# Patient Record
Sex: FEMALE | Race: WHITE | Employment: OTHER | URBAN - METROPOLITAN AREA
[De-identification: names, ages, dates, MRNs, and addresses within clinical notes are randomized per-mention and may not be internally consistent; named-entity substitution may affect disease eponyms.]

---

## 2017-01-24 ENCOUNTER — HOSPITAL ENCOUNTER (OUTPATIENT)
Dept: CT IMAGING | Age: 82
Discharge: HOME | End: 2017-01-24
Attending: INTERNAL MEDICINE
Payer: SELF-PAY

## 2017-01-24 DIAGNOSIS — E78.5 HYPERLIPIDEMIA: ICD-10-CM

## 2017-01-24 PROCEDURE — 75571 CT HRT W/O DYE W/CA TEST: CPT

## 2017-01-26 ENCOUNTER — TELEPHONE (OUTPATIENT)
Dept: CARDIAC REHAB | Age: 82
End: 2017-01-26

## 2017-01-26 NOTE — TELEPHONE ENCOUNTER
Called patient to discuss her CT scan results. Verified herdate of birth and then reviewed the patient results. Hercalcium score is 425. This corresponds to a significant amount of plaque noted in the coronary arteries. Kevin Hover a significantly greater risk of having a heart attack. It is reccommended that shefollow up with their PCP to make sure that any and all risk factors are being optimally managed and then the patient will most likely be referred to a cardiologist for further testing and treatments. Will fax copy of the report to herPCP, Malena Pruett. Patient verbalized understanding of the results.

## 2019-02-19 ENCOUNTER — OFFICE VISIT (OUTPATIENT)
Dept: INTERNAL MEDICINE CLINIC | Age: 84
End: 2019-02-19

## 2019-02-19 VITALS
HEIGHT: 61 IN | OXYGEN SATURATION: 97 % | SYSTOLIC BLOOD PRESSURE: 131 MMHG | HEART RATE: 67 BPM | BODY MASS INDEX: 27.19 KG/M2 | WEIGHT: 144 LBS | RESPIRATION RATE: 18 BRPM | TEMPERATURE: 97.9 F | DIASTOLIC BLOOD PRESSURE: 59 MMHG

## 2019-02-19 DIAGNOSIS — I48.20 CHRONIC ATRIAL FIBRILLATION (HCC): Primary | ICD-10-CM

## 2019-02-19 DIAGNOSIS — E78.2 MIXED HYPERLIPIDEMIA: ICD-10-CM

## 2019-02-19 DIAGNOSIS — B96.81 GASTRITIS DUE TO HELICOBACTER SPECIES: ICD-10-CM

## 2019-02-19 DIAGNOSIS — K21.9 GASTROESOPHAGEAL REFLUX DISEASE WITHOUT ESOPHAGITIS: ICD-10-CM

## 2019-02-19 DIAGNOSIS — J84.10 POSTINFLAMMATORY PULMONARY FIBROSIS (HCC): ICD-10-CM

## 2019-02-19 DIAGNOSIS — I10 ESSENTIAL HYPERTENSION: ICD-10-CM

## 2019-02-19 DIAGNOSIS — K57.30 DIVERTICULOSIS OF LARGE INTESTINE WITHOUT HEMORRHAGE: ICD-10-CM

## 2019-02-19 DIAGNOSIS — K29.70 GASTRITIS DUE TO HELICOBACTER SPECIES: ICD-10-CM

## 2019-02-19 DIAGNOSIS — M48.062 LUMBAR STENOSIS WITH NEUROGENIC CLAUDICATION: ICD-10-CM

## 2019-02-19 RX ORDER — TEMAZEPAM 15 MG/1
1 CAPSULE ORAL
Status: ON HOLD | COMMUNITY
End: 2022-06-09

## 2019-02-19 RX ORDER — VALACYCLOVIR HYDROCHLORIDE 500 MG/1
1 TABLET, FILM COATED ORAL 2 TIMES DAILY
COMMUNITY

## 2019-02-19 RX ORDER — FEXOFENADINE HCL AND PSEUDOEPHEDRINE HCI 180; 240 MG/1; MG/1
1 TABLET, EXTENDED RELEASE ORAL DAILY
COMMUNITY
End: 2019-04-02

## 2019-02-19 RX ORDER — MONTELUKAST SODIUM 10 MG/1
10 TABLET ORAL DAILY
COMMUNITY
End: 2019-07-03 | Stop reason: SDUPTHER

## 2019-02-19 RX ORDER — RANITIDINE 300 MG/1
1 TABLET ORAL
COMMUNITY
End: 2019-02-28 | Stop reason: SDUPTHER

## 2019-02-19 RX ORDER — DILTIAZEM HYDROCHLORIDE 120 MG/1
1 CAPSULE, EXTENDED RELEASE ORAL DAILY
COMMUNITY
End: 2019-05-05 | Stop reason: SINTOL

## 2019-02-19 RX ORDER — HYDROCHLOROTHIAZIDE 12.5 MG/1
12.5 TABLET ORAL DAILY
COMMUNITY
End: 2019-04-02

## 2019-02-19 RX ORDER — FLECAINIDE ACETATE 50 MG/1
1 TABLET ORAL 2 TIMES DAILY
COMMUNITY
End: 2019-04-02

## 2019-02-19 RX ORDER — SIMVASTATIN 10 MG/1
1 TABLET, FILM COATED ORAL
COMMUNITY

## 2019-02-19 RX ORDER — CANDESARTAN 8 MG/1
TABLET ORAL DAILY
COMMUNITY

## 2019-02-19 RX ORDER — OMEPRAZOLE 20 MG/1
20 CAPSULE, DELAYED RELEASE ORAL
COMMUNITY
End: 2019-04-02

## 2019-02-19 NOTE — PROGRESS NOTES
Chief Complaint   Patient presents with    Hypertension    Cholesterol Problem    Irregular Heart Beat     1. Have you been to the ER, urgent care clinic since your last visit? Hospitalized since your last visit? No    2. Have you seen or consulted any other health care providers outside of the [address] [place] [address] since your last visit? Include any pap smears or colon screening.  No

## 2019-02-19 NOTE — PROGRESS NOTES
HPI:     This christiano lady presents to the office for transfer of medical care. None of her [facility] records have been forwarded for review. [address] [facility] was queried and data populated into EMR with reconciliation of medications performed. She was diagnosed with H pylori + gastritis and prescribed triple therapy which she has placed on hold because of upcoming move. She is in the process of selling her home and moving to a smaller residence. She has severe GERD complicated by aspiration with findings of RLL fibrosis. Her symptoms have been well controlled on combination of Omeprazole and Ranitidine and has been advised to remain on this indefinitely. She recently completed Ceftin for sinusitis with improvement. She denied any accompanying fever, chills, sore throat or productive cough. She remains on NOAC for PAF and denies any bleeding complications on this. She has not had any problems with palpitations since last visit. She reports upcoming appointmentwith Dr. Rita Houston for radicular back pain ascribed to lumbar stenosis. The MRI report is not on file. She is aware that she will need to stop NOAC at least 48 hours prior to any intervention. Past Medical History:   Diagnosis Date    Atrial fibrillation (HCC)     GERD (gastroesophageal reflux disease)     Hypercholesterolemia     Hypertension     Pulmonary fibrosis (HCC)      Past Surgical History:   Procedure Laterality Date    HX GI      HX GYN      HX HEENT      HX HYSTERECTOMY      HX ORTHOPAEDIC       Current Outpatient Medications   Medication Sig    candesartan (ATACAND) 8 mg tablet Take  by mouth daily.  dilTIAZem XR (DILACOR XR) 120 mg XR capsule Take 1 Cap by mouth daily.  fexofenadine-pseudoephedrine (ALLEGRA-D 24) 180-240 mg per tablet Take 1 Tab by mouth daily.  flecainide (TAMBOCOR) 50 mg tablet Take 1 Tab by mouth two (2) times a day.     hydroCHLOROthiazide (HYDRODIURIL) 12.5 mg tablet Take 12.5 mg by mouth daily.    montelukast (SINGULAIR) 10 mg tablet Take 10 mg by mouth daily.  omeprazole (PRILOSEC) 20 mg capsule Take 20 mg by mouth ACB/HS.  raNITIdine (ZANTAC) 300 mg tab Take 1 Tab by mouth nightly.  simvastatin (ZOCOR) 10 mg tablet Take 1 Tab by mouth nightly.  temazepam (RESTORIL) 15 mg capsule Take 1 Cap by mouth nightly as needed.  valACYclovir (VALTREX) 500 mg tablet Take 1 Tab by mouth two (2) times a day.  apixaban (ELIQUIS) 5 mg tablet Take 1 Tab by mouth two (2) times a day.  vit a,c & e-lutein-minerals (OCUVITE) tablet daily. No current facility-administered medications for this visit. Allergies and Intolerances:   No Known Allergies  Family History:   No family history on file. Social History:   She  reports that she has quit smoking. she has never used smokeless tobacco.   Social History     Substance and Sexual Activity   Alcohol Use Yes    Comment: q hs     Immunization History:  Outside records reviewed      Review of Systems   Constitutional: Negative for chills, fever and weight loss. HENT: Positive for congestion. Negative for ear pain. Respiratory: Positive for cough. Negative for shortness of breath and wheezing. Cardiovascular: Negative for chest pain, palpitations and leg swelling. Gastrointestinal: Positive for heartburn. Genitourinary: Positive for hematuria. Musculoskeletal: Positive for back pain. Neurological: Negative for sensory change, focal weakness and headaches.        Physical:   Visit Vitals  /59 (BP 1 Location: Left arm, BP Patient Position: Sitting)   Pulse 67   Temp 97.9 °F (36.6 °C) (Oral)   Resp 18   Ht 5' 0.5\" (1.537 m)   Wt 144 lb (65.3 kg)   SpO2 97%   BMI 27.66 kg/m²        Exam:   Pleasant lady NAD    HEENT;   No icterus or pallor, clear phraynx, no sinus tenderness or nasal polyps  Neck:       no bruits or JVD  Chest:      regular S1S2, clear lungs, 1/6 BRYNN  Abd:         soft non tender abdomen without epigastric tenderness  Ext:          2+ pulses, no cyanosis or edema  Neuro:     DTR 2+/4+, MS 5+/5+, normal gait       Review of Data:  Labs:      Health Maintenance Due   Topic Date Due    DTaP/Tdap/Td series (1 - Tdap) 11/25/1955    Shingrix Vaccine Age 50> (1 of 2) 11/25/1984    GLAUCOMA SCREENING Q2Y  11/25/1999    Bone Densitometry (Dexa) Screening  11/25/1999    Pneumococcal 65+ Low/Medium Risk (1 of 2 - PCV13) 11/25/1999    Influenza Age 5 to Adult  08/01/2018          Impression/Plan:    Patient Active Problem List   Diagnosis Code    Chronic atrial fibrillation ([facility] on NOAC  Rx:  continue NOAC and easton blocking agent I48.2    Essential hypertension controlled  Rx:  continue current regimen and DASH I10    Gastritis due to Helicobacter pylori  Rx: triple therapy to be started K29.70, B96.81    Lumbar stenosis with neurogenic claudication   Rx: MOHAMUD planned, lumbar stabilization, avoid NSAID M48.062    Diverticulosis of large intestine without hemorrhage  Rx: importance of fiber and fluids K57.30    GERD with suspected aspiration  Rx:  continue anti-reflux measures along with H2A/PPI K21.9    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 Donnell Parish J84.10       Cassandra Wellington MD

## 2019-02-28 ENCOUNTER — HOSPITAL ENCOUNTER (OUTPATIENT)
Dept: LAB | Age: 84
Discharge: HOME | End: 2019-02-28
Payer: MEDICARE

## 2019-02-28 DIAGNOSIS — E78.2 MIXED HYPERLIPIDEMIA: ICD-10-CM

## 2019-02-28 DIAGNOSIS — I10 ESSENTIAL HYPERTENSION: ICD-10-CM

## 2019-02-28 DIAGNOSIS — I48.20 CHRONIC ATRIAL FIBRILLATION (HCC): ICD-10-CM

## 2019-02-28 LAB
ALBUMIN SERPL-MCNC: 3.8 G/DL (ref 3.4–5)
ALBUMIN/GLOB SERPL: 1.3 {RATIO} (ref 0.8–1.7)
ALP SERPL-CCNC: 41 U/L (ref 45–117)
ALT SERPL-CCNC: 21 U/L (ref 13–56)
ANION GAP SERPL CALC-SCNC: 9 MMOL/L (ref 3–18)
APPEARANCE UR: CLEAR
AST SERPL-CCNC: 16 U/L (ref 15–37)
BACTERIA URNS QL MICRO: NEGATIVE /HPF
BASOPHILS # BLD: 0 K/UL (ref 0–0.1)
BASOPHILS NFR BLD: 0 % (ref 0–2)
BILIRUB SERPL-MCNC: 0.6 MG/DL (ref 0.2–1)
BILIRUB UR QL: NEGATIVE
BUN SERPL-MCNC: 20 MG/DL (ref 7–18)
BUN/CREAT SERPL: 24 (ref 12–20)
CALCIUM SERPL-MCNC: 8.7 MG/DL (ref 8.5–10.1)
CHLORIDE SERPL-SCNC: 101 MMOL/L (ref 100–108)
CHOLEST SERPL-MCNC: 157 MG/DL
CO2 SERPL-SCNC: 28 MMOL/L (ref 21–32)
COLOR UR: YELLOW
CREAT SERPL-MCNC: 0.82 MG/DL (ref 0.6–1.3)
DIFFERENTIAL METHOD BLD: ABNORMAL
EOSINOPHIL # BLD: 0.1 K/UL (ref 0–0.4)
EOSINOPHIL NFR BLD: 2 % (ref 0–5)
EPITH CASTS URNS QL MICRO: NORMAL /LPF (ref 0–5)
ERYTHROCYTE [DISTWIDTH] IN BLOOD BY AUTOMATED COUNT: 12.6 % (ref 11.6–14.5)
GLOBULIN SER CALC-MCNC: 2.9 G/DL (ref 2–4)
GLUCOSE SERPL-MCNC: 82 MG/DL (ref 74–99)
GLUCOSE UR STRIP.AUTO-MCNC: NEGATIVE MG/DL
HCT VFR BLD AUTO: 42.2 % (ref 35–45)
HDLC SERPL-MCNC: 70 MG/DL (ref 40–60)
HDLC SERPL: 2.2 {RATIO} (ref 0–5)
HGB BLD-MCNC: 13.8 G/DL (ref 12–16)
HGB UR QL STRIP: NEGATIVE
KETONES UR QL STRIP.AUTO: NEGATIVE MG/DL
LDLC SERPL CALC-MCNC: 71 MG/DL (ref 0–100)
LEUKOCYTE ESTERASE UR QL STRIP.AUTO: ABNORMAL
LIPID PROFILE,FLP: ABNORMAL
LYMPHOCYTES # BLD: 1.5 K/UL (ref 0.9–3.6)
LYMPHOCYTES NFR BLD: 28 % (ref 21–52)
MCH RBC QN AUTO: 34.2 PG (ref 24–34)
MCHC RBC AUTO-ENTMCNC: 32.7 G/DL (ref 31–37)
MCV RBC AUTO: 104.7 FL (ref 74–97)
MONOCYTES # BLD: 0.5 K/UL (ref 0.05–1.2)
MONOCYTES NFR BLD: 9 % (ref 3–10)
NEUTS SEG # BLD: 3.1 K/UL (ref 1.8–8)
NEUTS SEG NFR BLD: 61 % (ref 40–73)
NITRITE UR QL STRIP.AUTO: NEGATIVE
PH UR STRIP: 6.5 [PH] (ref 5–8)
PLATELET # BLD AUTO: 255 K/UL (ref 135–420)
PMV BLD AUTO: 10.4 FL (ref 9.2–11.8)
POTASSIUM SERPL-SCNC: 4.2 MMOL/L (ref 3.5–5.5)
PROT SERPL-MCNC: 6.7 G/DL (ref 6.4–8.2)
PROT UR STRIP-MCNC: NEGATIVE MG/DL
RBC # BLD AUTO: 4.03 M/UL (ref 4.2–5.3)
RBC #/AREA URNS HPF: NORMAL /HPF (ref 0–5)
SODIUM SERPL-SCNC: 138 MMOL/L (ref 136–145)
SP GR UR REFRACTOMETRY: 1.02 (ref 1–1.03)
TRIGL SERPL-MCNC: 80 MG/DL (ref ?–150)
TSH SERPL DL<=0.05 MIU/L-ACNC: 1.8 UIU/ML (ref 0.36–3.74)
UROBILINOGEN UR QL STRIP.AUTO: 0.2 EU/DL (ref 0.2–1)
VLDLC SERPL CALC-MCNC: 16 MG/DL
WBC # BLD AUTO: 5.1 K/UL (ref 4.6–13.2)
WBC URNS QL MICRO: NORMAL /HPF (ref 0–4)

## 2019-02-28 PROCEDURE — 80061 LIPID PANEL: CPT

## 2019-02-28 PROCEDURE — 81001 URINALYSIS AUTO W/SCOPE: CPT

## 2019-02-28 PROCEDURE — 84443 ASSAY THYROID STIM HORMONE: CPT

## 2019-02-28 PROCEDURE — 80053 COMPREHEN METABOLIC PANEL: CPT

## 2019-02-28 PROCEDURE — 85025 COMPLETE CBC W/AUTO DIFF WBC: CPT

## 2019-02-28 RX ORDER — RANITIDINE 300 MG/1
300 TABLET ORAL
Qty: 90 TAB | Refills: 3 | Status: ON HOLD | OUTPATIENT
Start: 2019-02-28 | End: 2022-06-09

## 2019-02-28 NOTE — TELEPHONE ENCOUNTER
Patient called requesting refill on Ranitidine 300mg   Patient last appointment was 02/19/19  Patient next appointment is 05/20/19  Pharmacy patient wants refill to go to is [facility]

## 2019-03-02 ENCOUNTER — TELEPHONE (OUTPATIENT)
Dept: INTERNAL MEDICINE CLINIC | Age: 84
End: 2019-03-02

## 2019-03-02 NOTE — TELEPHONE ENCOUNTER
Patient advised of results and letter mailed. She has MOHAMUD scheduled for Monday and was apparently told by the  at pain management NOT to stop Eliquis as it was NO longer necessary.  I told her to STOP if as of NOW and inform pain management specialist of this

## 2019-03-31 ENCOUNTER — TELEPHONE (OUTPATIENT)
Dept: INTERNAL MEDICINE CLINIC | Age: 84
End: 2019-03-31

## 2019-04-02 ENCOUNTER — OFFICE VISIT (OUTPATIENT)
Dept: INTERNAL MEDICINE CLINIC | Age: 84
End: 2019-04-02

## 2019-04-02 VITALS
HEIGHT: 61 IN | TEMPERATURE: 98.1 F | OXYGEN SATURATION: 97 % | WEIGHT: 143 LBS | RESPIRATION RATE: 18 BRPM | BODY MASS INDEX: 27 KG/M2 | SYSTOLIC BLOOD PRESSURE: 132 MMHG | DIASTOLIC BLOOD PRESSURE: 68 MMHG | HEART RATE: 56 BPM

## 2019-04-02 DIAGNOSIS — I25.10 ATHEROSCLEROSIS OF NATIVE CORONARY ARTERY OF NATIVE HEART WITHOUT ANGINA PECTORIS: ICD-10-CM

## 2019-04-02 DIAGNOSIS — D51.9 ANEMIA DUE TO VITAMIN B12 DEFICIENCY, UNSPECIFIED B12 DEFICIENCY TYPE: ICD-10-CM

## 2019-04-02 DIAGNOSIS — D50.9 IRON DEFICIENCY ANEMIA, UNSPECIFIED IRON DEFICIENCY ANEMIA TYPE: Primary | ICD-10-CM

## 2019-04-02 PROBLEM — J45.20 MILD INTERMITTENT ASTHMA WITHOUT COMPLICATION: Status: ACTIVE | Noted: 2019-04-02

## 2019-04-02 PROBLEM — J30.1 SEASONAL ALLERGIC RHINITIS DUE TO POLLEN: Status: ACTIVE | Noted: 2019-04-02

## 2019-04-02 PROBLEM — I48.0 PAROXYSMAL ATRIAL FIBRILLATION (HCC): Status: ACTIVE | Noted: 2019-04-02

## 2019-04-02 PROBLEM — E78.2 MIXED HYPERLIPIDEMIA: Status: ACTIVE | Noted: 2019-04-02

## 2019-04-02 PROBLEM — I34.0 MILD MITRAL REGURGITATION: Status: ACTIVE | Noted: 2019-04-02

## 2019-04-02 RX ORDER — MINERAL OIL
180 ENEMA (ML) RECTAL DAILY
COMMUNITY

## 2019-04-02 RX ORDER — RANITIDINE 300 MG/1
1 TABLET ORAL
COMMUNITY
End: 2019-04-02

## 2019-04-02 RX ORDER — ALBUTEROL SULFATE 90 UG/1
2 AEROSOL, METERED RESPIRATORY (INHALATION)
Status: ON HOLD | COMMUNITY
End: 2022-06-09

## 2019-04-02 RX ORDER — FLECAINIDE ACETATE 100 MG/1
100 TABLET ORAL 2 TIMES DAILY
COMMUNITY
Start: 2017-04-15 | End: 2022-06-24

## 2019-04-02 RX ORDER — OMEPRAZOLE 40 MG/1
1 CAPSULE, DELAYED RELEASE ORAL DAILY
COMMUNITY

## 2019-04-02 RX ORDER — IPRATROPIUM BROMIDE 42 UG/1
2 SPRAY, METERED NASAL DAILY
COMMUNITY

## 2019-04-02 NOTE — PROGRESS NOTES
HPI:  
 
This christiano lady presents to the office for hospital follow up visit. She was admitted for chest pain with MI ruled out and negative NST. She thought this was likely from strain because of recent move. She is currently asymptomatic. She had prior CAC scoring done at [facility] with report not on file. She had high degree of atherosclerotic burden. She reported rising BP trend to 093 systolic before her admission but this had normalized by time she presented to the ER. No adjustments in her medication were made. She is unaware of any palpitation and is on Tambocor and Eliquis for AF. She had recent echo showing mild to moderate MR with normal LA size and normal LVEF. She also had findings of aortic valve sclerosis. She was noted to be anemic during her ER visit but denies any passage of melenic stools. She had prior history of GI bleeding with negative endoscopic investigation. She also recalls that the CBC was drawn from her hep lock which may have affected results. She is on chronic PPI and H2A for severe GERD that has been implicated in her pulmonary fibrosis. She never had any heartburn symptoms and was provided with diagnosis of silent GERD. She had recent MOHAMUD by Dr. Lino Owens with report not on file. I asked her to STOP Eliquis 48 hours prior to procedure but she was told by pain management that this was not necessary. The report has not been forwarded. Past Medical History:  
Diagnosis Date  Atherosclerosis of native coronary artery of native heart 4/2/2019 CAC score at [facility] >400  Atrial fibrillation ([address]  GERD (gastroesophageal reflux disease)  Hypercholesterolemia  Hypertension  Pulmonary fibrosis ([address] Past Surgical History:  
Procedure Laterality Date  HX BUNIONECTOMY  HX GI    
 HX GYN    
 HX HEENT    
 HX HYSTERECTOMY  HX HYSTERECTOMY  HX KNEE REPLACEMENT Right  HX ORTHOPAEDIC    
 HX SEPTOPLASTY  HX SHOULDER REPLACEMENT Left  HI MASTECTOMY, PARTIAL Right [address] Current Outpatient Medications Medication Sig  
 flecainide (TAMBOCOR) 100 mg tablet Take 100 mg by mouth two (2) times a day.  omeprazole (PRILOSEC) 40 mg capsule Take 1 Cap by mouth daily.  peg 400-propylene glycol (SYSTANE GEL) 0.4-0.3 % drpg Apply 1 Drop to eye four (4) times daily.  ipratropium (ATROVENT) 42 mcg (0.06 %) nasal spray 2 Sprays by Both Nostrils route daily.  fexofenadine (ALLEGRA) 180 mg tablet Take 180 mg by mouth daily.  albuterol (PROVENTIL HFA, VENTOLIN HFA, PROAIR HFA) 90 mcg/actuation inhaler Take 2 Puffs by inhalation every six (6) hours as needed.  raNITIdine (ZANTAC) 300 mg tab Take 1 Tab by mouth nightly.  candesartan (ATACAND) 8 mg tablet Take  by mouth daily.  vit a,c & e-lutein-minerals (OCUVITE) tablet daily.  dilTIAZem XR (DILACOR XR) 120 mg XR capsule Take 1 Cap by mouth daily.  montelukast (SINGULAIR) 10 mg tablet Take 10 mg by mouth daily.  simvastatin (ZOCOR) 10 mg tablet Take 1 Tab by mouth nightly.  temazepam (RESTORIL) 15 mg capsule Take 1 Cap by mouth nightly as needed.  valACYclovir (VALTREX) 500 mg tablet Take 1 Tab by mouth two (2) times a day.  apixaban (ELIQUIS) 5 mg tablet Take 1 Tab by mouth two (2) times a day. No current facility-administered medications for this visit. Allergies and Intolerances: Allergies Allergen Reactions  Latex Itching Gloves only  Atorvastatin Myalgia  Hydromorphone Itching Possibly allergic. Had some difficulty with PCA pump after Knee replacement Family History:  
Family History Problem Relation Age of Onset  Tuberculosis Mother  Other Father Social History: She  reports that she has quit smoking. She has never used smokeless tobacco.  
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 HENT: Negative for hearing loss. Eyes: Negative for blurred vision. Respiratory: Positive for wheezing. Negative for cough. Cardiovascular: Negative for chest pain, palpitations and leg swelling. Gastrointestinal: Positive for melena. Negative for blood in stool and heartburn. Genitourinary: Negative for frequency. Musculoskeletal: Positive for back pain. Neurological: Negative for dizziness and headaches. Endo/Heme/Allergies: Bruises/bleeds easily. Psychiatric/Behavioral: Negative for depression and memory loss. The patient has insomnia. Physical:  
Visit Vitals /68 (BP 1 Location: Left arm, BP Patient Position: Sitting) Pulse (!) 56 Temp 98.1 °F (36.7 °C) (Oral) Resp 18 Ht 5' 0.5\" (1.537 m) Wt 143 lb (64.9 kg) SpO2 97% BMI 27.47 kg/m² Physical Exam  
Constitutional: She is well-developed, well-nourished, and in no distress. No distress. Eyes: Conjunctivae are normal. No scleral icterus. Neck: Neck supple. No JVD present. Cardiovascular: Normal rate. An irregularly irregular rhythm present. Murmur heard. Crescendo systolic murmur is present with a grade of 1/6. Pulmonary/Chest: Breath sounds normal. She has no wheezes. She has no rales. Abdominal: Soft. Bowel sounds are normal. She exhibits no mass. There is no tenderness. Musculoskeletal: She exhibits no edema. Lymphadenopathy:  
  She has no cervical adenopathy. Skin: Skin is warm. She is not diaphoretic. Vitals reviewed. Review of Data: 
Labs: Hospital Outpatient Visit on 02/28/2019 Component Date Value Ref Range Status  WBC 02/28/2019 5.1  4.6 - 13.2 K/uL Final  
 RBC 02/28/2019 4.03* 4.20 - 5.30 M/uL Final  
 HGB 02/28/2019 13.8  12.0 - 16.0 g/dL Final  
 HCT 02/28/2019 42.2  35.0 - 45.0 % Final  
 MCV 02/28/2019 104.7* 74.0 - 97.0 FL Final  
 MCH 02/28/2019 34.2* 24.0 - 34.0 PG Final  
 MCHC 02/28/2019 32.7  31.0 - 37.0 g/dL Final  
 RDW 02/28/2019 12.6  11.6 - 14.5 % Final  
 PLATELET 11/42/7753 552  135 - 420 K/uL Final  
 MPV 02/28/2019 10.4  9.2 - 11.8 FL Final  
 NEUTROPHILS 02/28/2019 61  40 - 73 % Final  
 LYMPHOCYTES 02/28/2019 28  21 - 52 % Final  
 MONOCYTES 02/28/2019 9  3 - 10 % Final  
 EOSINOPHILS 02/28/2019 2  0 - 5 % Final  
 BASOPHILS 02/28/2019 0  0 - 2 % Final  
 ABS. NEUTROPHILS 02/28/2019 3.1  1.8 - 8.0 K/UL Final  
 ABS. LYMPHOCYTES 02/28/2019 1.5  0.9 - 3.6 K/UL Final  
 ABS. MONOCYTES 02/28/2019 0.5  0.05 - 1.2 K/UL Final  
 ABS. EOSINOPHILS 02/28/2019 0.1  0.0 - 0.4 K/UL Final  
 ABS. BASOPHILS 02/28/2019 0.0  0.0 - 0.1 K/UL Final  
 DF 02/28/2019 AUTOMATED    Final  
 Sodium 02/28/2019 138  136 - 145 mmol/L Final  
 Potassium 02/28/2019 4.2  3.5 - 5.5 mmol/L Final  
 Chloride 02/28/2019 101  100 - 108 mmol/L Final  
 CO2 02/28/2019 28  21 - 32 mmol/L Final  
 Anion gap 02/28/2019 9  3.0 - 18 mmol/L Final  
 Glucose 02/28/2019 82  74 - 99 mg/dL Final  
 BUN 02/28/2019 20* 7.0 - 18 MG/DL Final  
 Creatinine 02/28/2019 0.82  0.6 - 1.3 MG/DL Final  
 BUN/Creatinine ratio 02/28/2019 24* 12 - 20   Final  
 GFR est AA 02/28/2019 >60  >60 ml/min/1.73m2 Final  
 GFR est non-AA 02/28/2019 >60  >60 ml/min/1.73m2 Final  
 Calcium 02/28/2019 8.7  8.5 - 10.1 MG/DL Final  
 Bilirubin, total 02/28/2019 0.6  0.2 - 1.0 MG/DL Final  
 ALT (SGPT) 02/28/2019 21  13 - 56 U/L Final  
 AST (SGOT) 02/28/2019 16  15 - 37 U/L Final  
 Alk.  phosphatase 02/28/2019 41* 45 - 117 U/L Final  
 Protein, total 02/28/2019 6.7  6.4 - 8.2 g/dL Final  
 Albumin 02/28/2019 3.8  3.4 - 5.0 g/dL Final  
  Globulin 02/28/2019 2.9  2.0 - 4.0 g/dL Final  
 A-G Ratio 02/28/2019 1.3  0.8 - 1.7   Final  
 LIPID PROFILE 02/28/2019        Final  
 Cholesterol, total 02/28/2019 157  <200 MG/DL Final  
 Triglyceride 02/28/2019 80  <150 MG/DL Final  
 HDL Cholesterol 02/28/2019 70* 40 - 60 MG/DL Final  
 LDL, calculated 02/28/2019 71  0 - 100 MG/DL Final  
 VLDL, calculated 02/28/2019 16  MG/DL Final  
 CHOL/HDL Ratio 02/28/2019 2.2  0 - 5.0   Final  
 TSH 02/28/2019 1.80  0.36 - 3.74 uIU/mL Final  
 Color 02/28/2019 YELLOW    Final  
 Appearance 02/28/2019 CLEAR    Final  
 Specific gravity 02/28/2019 1.021  1.005 - 1.030   Final  
 pH (UA) 02/28/2019 6.5  5.0 - 8.0   Final  
 Protein 02/28/2019 NEGATIVE   NEG mg/dL Final  
 Glucose 02/28/2019 NEGATIVE   NEG mg/dL Final  
 Ketone 02/28/2019 NEGATIVE   NEG mg/dL Final  
 Bilirubin 02/28/2019 NEGATIVE   NEG   Final  
 Blood 02/28/2019 NEGATIVE   NEG   Final  
 Urobilinogen 02/28/2019 0.2  0.2 - 1.0 EU/dL Final  
 Nitrites 02/28/2019 NEGATIVE   NEG   Final  
 Leukocyte Esterase 02/28/2019 TRACE* NEG   Final  
 WBC 02/28/2019 1 to 3  0 - 4 /hpf Final  
 RBC 02/28/2019 0 to 1  0 - 5 /hpf Final  
 Epithelial cells 02/28/2019 FEW  0 - 5 /lpf Final  
 Bacteria 02/28/2019 NEGATIVE   NEG /hpf Final  
 
 
Impression/Plan: 
 Patient Active Problem List  
Diagnosis  Code Chest pain likely MSK Reassured, avoid NSAID R07.89  
 Essential hypertension controlled Continue current regimen I10  
 Anemia with concern for occult GI loss or malabsorption (PPI) Repeat CBC with iron panel and B12 level D63.1  Lumbar stenosis with neurogenic claudication Obtain records from Dr. Roberto Carlos Wright M48.062  
 Paroxysmal atrial fibrillation ([address] Continue Tambocor and NOAC and alert Dr. Yared Kothari to recent admission I48.0  Mixed hyperlipidemia with coronary atherosclerosis Continue statin therapy and obtain copy of CAC scoring and review any prior vascular imaging E78.2  Incomplete database Advised to request records from [facility] (2nd) I25.10 Kelsie Babinski, MD

## 2019-04-02 NOTE — PROGRESS NOTES
Chief Complaint Patient presents with  
[facility] Follow Up 1. Have you been to the ER, urgent care clinic since your last visit? Hospitalized since your last visit? Yes When: [facility] Where: 3/28/19 
 
2. Have you seen or consulted any other health care providers outside of the [address] [place] [address] since your last visit? Include any pap smears or colon screening.  No

## 2019-04-02 NOTE — PATIENT INSTRUCTIONS
Atypical chest pain>>negative stress test and enzymes Coronary atherosclerosis>>Zocor and I need to get copy from [facility] 
 
BP is controlled Reflux>>Zantac and Prilosec Pulmonary scar from reflux>>+ component of asthma Allergies>>Singulair and Atrovent Anemia>>B12 and iron>>malabsorption from Prilosec or occult blood loss Atrial Fibrillation >>Tambocor and Eliquis

## 2019-04-03 DIAGNOSIS — D50.0 IRON DEFICIENCY ANEMIA DUE TO CHRONIC BLOOD LOSS: Primary | ICD-10-CM

## 2019-04-03 LAB
ERYTHROCYTE [DISTWIDTH] IN BLOOD BY AUTOMATED COUNT: 13.4 % (ref 12.3–15.4)
HCT VFR BLD AUTO: 31.9 % (ref 34–46.6)
HGB BLD-MCNC: 10.7 G/DL (ref 11.1–15.9)
IRON SATN MFR SERPL: 6 % (ref 15–55)
IRON SERPL-MCNC: 25 UG/DL (ref 27–139)
MCH RBC QN AUTO: 32.9 PG (ref 26.6–33)
MCHC RBC AUTO-ENTMCNC: 33.5 G/DL (ref 31.5–35.7)
MCV RBC AUTO: 98 FL (ref 79–97)
PLATELET # BLD AUTO: 257 X10E3/UL (ref 150–379)
RBC # BLD AUTO: 3.25 X10E6/UL (ref 3.77–5.28)
TIBC SERPL-MCNC: 405 UG/DL (ref 250–450)
UIBC SERPL-MCNC: 380 UG/DL (ref 118–369)
VIT B12 SERPL-MCNC: 362 PG/ML (ref 232–1245)
WBC # BLD AUTO: 5.4 X10E3/UL (ref 3.4–10.8)

## 2019-04-08 ENCOUNTER — TELEPHONE (OUTPATIENT)
Dept: INTERNAL MEDICINE CLINIC | Age: 84
End: 2019-04-08

## 2019-04-08 NOTE — TELEPHONE ENCOUNTER
Patient called to report that she had black stool yesterday and it is dark this morning, but not as dark as yesterday. Wants to know if she needs to come in for an appointment with Zhen Hernandez. Please advise.

## 2019-04-09 LAB — HEMOCCULT STL QL IA: POSITIVE

## 2019-04-12 ENCOUNTER — OFFICE VISIT (OUTPATIENT)
Dept: INTERNAL MEDICINE CLINIC | Age: 84
End: 2019-04-12

## 2019-04-12 VITALS
HEART RATE: 61 BPM | WEIGHT: 146 LBS | OXYGEN SATURATION: 97 % | TEMPERATURE: 98.1 F | DIASTOLIC BLOOD PRESSURE: 65 MMHG | BODY MASS INDEX: 27.56 KG/M2 | RESPIRATION RATE: 18 BRPM | SYSTOLIC BLOOD PRESSURE: 128 MMHG | HEIGHT: 61 IN

## 2019-04-12 DIAGNOSIS — K31.811 GASTROINTESTINAL HEMORRHAGE ASSOCIATED WITH ANGIODYSPLASIA OF STOMACH AND DUODENUM: Primary | ICD-10-CM

## 2019-04-12 NOTE — PROGRESS NOTES
Chief Complaint Patient presents with  Rectal Bleeding 1. Have you been to the ER, urgent care clinic since your last visit? Hospitalized since your last visit? No 
 
2. Have you seen or consulted any other health care providers outside of the [address] [place] [address] since your last visit? Include any pap smears or colon screening.  No

## 2019-04-12 NOTE — PROGRESS NOTES
HPI:  
 
This lady is here on an urgent basis because of fatigue, dizziness and passage of dark stools. She had already stropped Eliquis as directed on her last visit and continues to take both Prilosec and Zantac as directed  Her endoscopic workup by Dr. 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 Her most recent hemoglobin was 10.7. She has not been able to get a hold of Dr. Ferol Lennox and came here for recommendations. Past Medical History:  
Diagnosis Date  Atherosclerosis of native coronary artery of native heart 4/2/2019 CAC score at [facility] >400  Atrial fibrillation ([facility]  Breast cancer ([facility] [facility]  Diverticulosis  Gastric AVM  GERD (gastroesophageal reflux disease)  Hypercholesterolemia  Hypertension  Immunizations up to date Prevnar 13, Pneumovax, Zostavax, Flu  Macrocytosis without anemia  Mild aortic regurgitation  Mild mitral regurgitation  Osteoporosis  Pulmonary fibrosis ([facility] [facility]  Pulmonary fibrosis, postinflammatory (HCC) GERD  Sciatica Past Surgical History:  
Procedure Laterality Date  HX BUNIONECTOMY  HX HYSTERECTOMY  HX KNEE REPLACEMENT Right  HX OTHER SURGICAL    
 SCCA arm removed  HX SEPTOPLASTY  HX SHOULDER REPLACEMENT Left  [place] MASTECTOMY, PARTIAL Right 1992 [address] Current Outpatient Medications Medication Sig  
 omeprazole (PRILOSEC) 40 mg capsule Take 1 Cap by mouth daily.  peg 400-propylene glycol (SYSTANE GEL) 0.4-0.3 % drpg Apply 1 Drop to eye four (4) times daily.  fexofenadine (ALLEGRA) 180 mg tablet Take 180 mg by mouth daily.  albuterol (PROVENTIL HFA, VENTOLIN HFA, PROAIR HFA) 90 mcg/actuation inhaler Take 2 Puffs by inhalation every six (6) hours as needed.  raNITIdine (ZANTAC) 300 mg tab Take 1 Tab by mouth nightly.  candesartan (ATACAND) 8 mg tablet Take  by mouth daily.  vit a,c & e-lutein-minerals (OCUVITE) tablet daily.  dilTIAZem XR (DILACOR XR) 120 mg XR capsule Take 1 Cap by mouth daily.  montelukast (SINGULAIR) 10 mg tablet Take 10 mg by mouth daily.  simvastatin (ZOCOR) 10 mg tablet Take 1 Tab by mouth nightly.  temazepam (RESTORIL) 15 mg capsule Take 1 Cap by mouth nightly as needed.  valACYclovir (VALTREX) 500 mg tablet Take 1 Tab by mouth two (2) times a day.  apixaban (ELIQUIS) 5 mg tablet Take 1 Tab by mouth two (2) times a day.  flecainide (TAMBOCOR) 100 mg tablet Take 100 mg by mouth two (2) times a day.  ipratropium (ATROVENT) 42 mcg (0.06 %) nasal spray 2 Sprays by Both Nostrils route daily. No current facility-administered medications for this visit. Allergies and Intolerances: Allergies Allergen Reactions  Latex Itching Gloves only  Atorvastatin Myalgia  Hydromorphone Itching Possibly allergic. Had some difficulty with PCA pump after Knee replacement Family History:  
Family History Problem Relation Age of Onset  Tuberculosis Mother  Other Father Social History: She  reports that she has quit smoking. She has never used smokeless tobacco.  
Social History Substance and Sexual Activity Alcohol Use Yes Comment: q hs Physical:  
Visit Vitals /65 (BP 1 Location: Left arm, BP Patient Position: Sitting) Pulse 61 Temp 98.1 °F (36.7 °C) (Oral) Resp 18 Ht 5' 0.5\" (1.537 m) Wt 146 lb (66.2 kg) SpO2 97% BMI 28.04 kg/m² Physical Exam  
Constitutional:  
Tired appearing lady without pallid complexion HENT:  
Mouth/Throat: Oropharynx is clear and moist.  
Eyes: No scleral icterus. Cardiovascular: Normal rate, regular rhythm and normal heart sounds. Pulmonary/Chest: Breath sounds normal.  
Abdominal: Soft. Bowel sounds are normal. There is no tenderness. Vitals reviewed. Review of Data:      Stool was guiac + and black Labs: 
Orders Only on 04/03/2019 Component Date Value Ref Range Status  Occult blood fecal, by IA 04/05/2019 Positive* Negative Final  
Office Visit on 04/02/2019 Component Date Value Ref Range Status  WBC 04/02/2019 5.4  3.4 - 10.8 x10E3/uL Final  
 RBC 04/02/2019 3.25* 3.77 - 5.28 x10E6/uL Final  
 HGB 04/02/2019 10.7* 11.1 - 15.9 g/dL Final  
 HCT 04/02/2019 31.9* 34.0 - 46.6 % Final  
 MCV 04/02/2019 98* 79 - 97 fL Final  
 MCH 04/02/2019 32.9  26.6 - 33.0 pg Final  
 MCHC 04/02/2019 33.5  31.5 - 35.7 g/dL Final  
 RDW 04/02/2019 13.4  12.3 - 15.4 % Final  
 PLATELET 41/06/2162 006  150 - 379 x10E3/uL Final  
 TIBC 04/02/2019 405  250 - 450 ug/dL Final  
 UIBC 04/02/2019 380* 118 - 369 ug/dL Final  
 Iron 04/02/2019 25* 27 - 139 ug/dL Final  
 Iron % saturation 04/02/2019 6* 15 - 55 % Final  
 Vitamin B12 04/02/2019 362  232 - 1,245 pg/mL Final  
Hospital Outpatient Visit on 02/28/2019 Component Date Value Ref Range Status  WBC 02/28/2019 5.1  4.6 - 13.2 K/uL Final  
 RBC 02/28/2019 4.03* 4.20 - 5.30 M/uL Final  
 HGB 02/28/2019 13.8  12.0 - 16.0 g/dL Final  
 HCT 02/28/2019 42.2  35.0 - 45.0 % Final  
 MCV 02/28/2019 104.7* 74.0 - 97.0 FL Final  
 MCH 02/28/2019 34.2* 24.0 - 34.0 PG Final  
 MCHC 02/28/2019 32.7  31.0 - 37.0 g/dL Final  
 RDW 02/28/2019 12.6  11.6 - 14.5 % Final  
 PLATELET 09/14/9126 933  135 - 420 K/uL Final  
 MPV 02/28/2019 10.4  9.2 - 11.8 FL Final  
 NEUTROPHILS 02/28/2019 61  40 - 73 % Final  
 LYMPHOCYTES 02/28/2019 28  21 - 52 % Final  
 MONOCYTES 02/28/2019 9  3 - 10 % Final  
 EOSINOPHILS 02/28/2019 2  0 - 5 % Final  
 BASOPHILS 02/28/2019 0  0 - 2 % Final  
 ABS. NEUTROPHILS 02/28/2019 3.1  1.8 - 8.0 K/UL Final  
 ABS. LYMPHOCYTES 02/28/2019 1.5  0.9 - 3.6 K/UL Final  
 ABS. MONOCYTES 02/28/2019 0.5  0.05 - 1.2 K/UL Final  
 ABS.  EOSINOPHILS 02/28/2019 0.1  0.0 - 0.4 K/UL Final  
  ABS. BASOPHILS 02/28/2019 0.0  0.0 - 0.1 K/UL Final  
 DF 02/28/2019 AUTOMATED    Final  
 Sodium 02/28/2019 138  136 - 145 mmol/L Final  
 Potassium 02/28/2019 4.2  3.5 - 5.5 mmol/L Final  
 Chloride 02/28/2019 101  100 - 108 mmol/L Final  
 CO2 02/28/2019 28  21 - 32 mmol/L Final  
 Anion gap 02/28/2019 9  3.0 - 18 mmol/L Final  
 Glucose 02/28/2019 82  74 - 99 mg/dL Final  
 BUN 02/28/2019 20* 7.0 - 18 MG/DL Final  
 Creatinine 02/28/2019 0.82  0.6 - 1.3 MG/DL Final  
 BUN/Creatinine ratio 02/28/2019 24* 12 - 20   Final  
 GFR est AA 02/28/2019 >60  >60 ml/min/1.73m2 Final  
 GFR est non-AA 02/28/2019 >60  >60 ml/min/1.73m2 Final  
 Calcium 02/28/2019 8.7  8.5 - 10.1 MG/DL Final  
 Bilirubin, total 02/28/2019 0.6  0.2 - 1.0 MG/DL Final  
 ALT (SGPT) 02/28/2019 21  13 - 56 U/L Final  
 AST (SGOT) 02/28/2019 16  15 - 37 U/L Final  
 Alk.  phosphatase 02/28/2019 41* 45 - 117 U/L Final  
 Protein, total 02/28/2019 6.7  6.4 - 8.2 g/dL Final  
 Albumin 02/28/2019 3.8  3.4 - 5.0 g/dL Final  
 Globulin 02/28/2019 2.9  2.0 - 4.0 g/dL Final  
 A-G Ratio 02/28/2019 1.3  0.8 - 1.7   Final  
 LIPID PROFILE 02/28/2019        Final  
 Cholesterol, total 02/28/2019 157  <200 MG/DL Final  
 Triglyceride 02/28/2019 80  <150 MG/DL Final  
 HDL Cholesterol 02/28/2019 70* 40 - 60 MG/DL Final  
 LDL, calculated 02/28/2019 71  0 - 100 MG/DL Final  
 VLDL, calculated 02/28/2019 16  MG/DL Final  
 CHOL/HDL Ratio 02/28/2019 2.2  0 - 5.0   Final  
 TSH 02/28/2019 1.80  0.36 - 3.74 uIU/mL Final  
 Color 02/28/2019 YELLOW    Final  
 Appearance 02/28/2019 CLEAR    Final  
 Specific gravity 02/28/2019 1.021  1.005 - 1.030   Final  
 pH (UA) 02/28/2019 6.5  5.0 - 8.0   Final  
 Protein 02/28/2019 NEGATIVE   NEG mg/dL Final  
 Glucose 02/28/2019 NEGATIVE   NEG mg/dL Final  
 Ketone 02/28/2019 NEGATIVE   NEG mg/dL Final  
 Bilirubin 02/28/2019 NEGATIVE   NEG   Final  
  Blood 02/28/2019 NEGATIVE   NEG   Final  
 Urobilinogen 02/28/2019 0.2  0.2 - 1.0 EU/dL Final  
 Nitrites 02/28/2019 NEGATIVE   NEG   Final  
 Leukocyte Esterase 02/28/2019 TRACE* NEG   Final  
 WBC 02/28/2019 1 to 3  0 - 4 /hpf Final  
 RBC 02/28/2019 0 to 1  0 - 5 /hpf Final  
 Epithelial cells 02/28/2019 FEW  0 - 5 /lpf Final  
 Bacteria 02/28/2019 NEGATIVE   NEG /hpf Final  
 
 
Impression/Plan: 
 Patient Active Problem List  
Diagnosis  Code Gi bleeding likely from small bowel telangiectasia ER evaluation recommended P54.3  Anemia secondary to above Address underlying etiology, consider PRBC K57.30  Paroxysmal atrial fibrillation (HCC) Eliquis on hold, consider Watchman device I48.0 Richi Sewell MD

## 2019-04-25 ENCOUNTER — TELEPHONE (OUTPATIENT)
Dept: INTERNAL MEDICINE CLINIC | Age: 84
End: 2019-04-25

## 2019-04-25 NOTE — TELEPHONE ENCOUNTER
Spoke to patient and she provide me an update:    1. Dr. Natalee Damon recommended continuing with NOAC  2. Arrangements made for her to receive IV iron through Dr. Giuseppe Moore  3.  Arrangements for visit with Dr. Rd Black to discuss option of Watchman device

## 2019-04-26 ENCOUNTER — TELEPHONE (OUTPATIENT)
Dept: INTERNAL MEDICINE CLINIC | Age: 84
End: 2019-04-26

## 2019-04-26 NOTE — TELEPHONE ENCOUNTER
Pt called to let you know, she saw Cardiology today. They did an EKG-There were no P waves and Diastolic was low. She was told to stop the Cardiazem and will go Monday for a heart monitor and also labs were drawn today.

## 2019-04-28 PROBLEM — C50.919 BREAST CANCER (HCC): Status: ACTIVE | Noted: 2019-04-28

## 2019-04-28 PROBLEM — K58.9 IRRITABLE BOWEL SYNDROME: Status: ACTIVE | Noted: 2019-04-28

## 2019-04-28 PROBLEM — M19.90 OSTEOARTHRITIS: Status: ACTIVE | Noted: 2019-04-28

## 2019-04-28 PROBLEM — K55.21 AVM (ARTERIOVENOUS MALFORMATION) OF SMALL BOWEL, ACQUIRED WITH HEMORRHAGE: Status: ACTIVE | Noted: 2019-04-28

## 2019-04-28 PROBLEM — M81.0 AGE-RELATED OSTEOPOROSIS WITHOUT CURRENT PATHOLOGICAL FRACTURE: Status: ACTIVE | Noted: 2019-04-28

## 2019-05-20 ENCOUNTER — OFFICE VISIT (OUTPATIENT)
Dept: INTERNAL MEDICINE CLINIC | Age: 84
End: 2019-05-20

## 2019-05-20 VITALS
WEIGHT: 146 LBS | SYSTOLIC BLOOD PRESSURE: 131 MMHG | BODY MASS INDEX: 27.56 KG/M2 | TEMPERATURE: 98 F | RESPIRATION RATE: 18 BRPM | HEART RATE: 63 BPM | OXYGEN SATURATION: 97 % | HEIGHT: 61 IN | DIASTOLIC BLOOD PRESSURE: 69 MMHG

## 2019-05-20 DIAGNOSIS — E53.8 VITAMIN B12 DEFICIENCY: ICD-10-CM

## 2019-05-20 DIAGNOSIS — D64.9 ANEMIA, UNSPECIFIED TYPE: Primary | ICD-10-CM

## 2019-05-20 NOTE — PROGRESS NOTES
Chief Complaint Patient presents with  Cholesterol Problem  GERD  Follow-up 1. Have you been to the ER, urgent care clinic since your last visit? Hospitalized since your last visit? No 
 
2. Have you seen or consulted any other health care providers outside of the [address] [place] [address] since your last visit? Include any pap smears or colon screening.  No

## 2019-05-20 NOTE — PROGRESS NOTES
HPI:  
 
This christiano lady has been evaluated by Dr. Kavon Giron for VIDA with IFOB and was told that the source was small bowel. There were no plans for intervention and was told to continue iron replacement therapy and to resume Eliquis for stroke protection. She was subsequently seen by Dr. Bernardo Clay and was told that her iron level was normal and that she did not qualify for iron replacement. She asked his PA what the source of the anemia was if iron level was low and was told that she did not know. She was also taken off Diltiazem by Dr. Aylin Avilez because of junctional rhythm and is now on Tambocor for treatment of AF. I do not have the consultation noted for review. Past Medical History:  
Diagnosis Date  Age-related osteoporosis without current pathological fracture 4/28/2019  Atherosclerosis of native coronary artery of native heart 4/2/2019 CAC score at [facility] >400  Atrial fibrillation ([facility] [address]  AVM (arteriovenous malformation) of small bowel, acquired with hemorrhage 4/28/2019 Found on capsule endoscopy  Breast cancer ([address] 4/28/2019 S/P left partial mastectomy  Diverticulosis  GERD (gastroesophageal reflux disease)  Hypercholesterolemia  Hypertension  Immunizations up to date Prevnar 13, Pneumovax, Zostavax, Flu  
 Irritable bowel syndrome 4/28/2019  Junctional rhythm 2019  
 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  
Procedure Laterality Date  HX BUNIONECTOMY  HX HYSTERECTOMY  HX KNEE REPLACEMENT Right  HX OTHER SURGICAL    
 SCCA arm removed  HX SEPTOPLASTY  HX SHOULDER REPLACEMENT Left  [place] MASTECTOMY, PARTIAL Right 1992 [address] [facility]    
 
 Current Outpatient Medications Medication Sig  
 omeprazole (PRILOSEC) 40 mg capsule Take 1 Cap by mouth daily.  flecainide (TAMBOCOR) 100 mg tablet Take 100 mg by mouth two (2) times a day.  peg 400-propylene glycol (SYSTANE GEL) 0.4-0.3 % drpg Apply 1 Drop to eye four (4) times daily.  ipratropium (ATROVENT) 42 mcg (0.06 %) nasal spray 2 Sprays by Both Nostrils route daily.  fexofenadine (ALLEGRA) 180 mg tablet Take 180 mg by mouth daily.  albuterol (PROVENTIL HFA, VENTOLIN HFA, PROAIR HFA) 90 mcg/actuation inhaler Take 2 Puffs by inhalation every six (6) hours as needed.  raNITIdine (ZANTAC) 300 mg tab Take 1 Tab by mouth nightly.  candesartan (ATACAND) 8 mg tablet Take  by mouth daily.  vit a,c & e-lutein-minerals (OCUVITE) tablet daily.  montelukast (SINGULAIR) 10 mg tablet Take 10 mg by mouth daily.  simvastatin (ZOCOR) 10 mg tablet Take 1 Tab by mouth nightly.  temazepam (RESTORIL) 15 mg capsule Take 1 Cap by mouth nightly as needed.  valACYclovir (VALTREX) 500 mg tablet Take 1 Tab by mouth two (2) times a day.  apixaban (ELIQUIS) 5 mg tablet Take 1 Tab by mouth two (2) times a day. No current facility-administered medications for this visit. Allergies and Intolerances: Allergies Allergen Reactions  Latex Itching Gloves only  Atorvastatin Myalgia  Diltiazem Other (comments) Junctional rhythm  Hydromorphone Itching Possibly allergic. Had some difficulty with PCA pump after Knee replacement  Morphine Itching  Percocet [Oxycodone-Acetaminophen] Itching Family History:  
Family History Problem Relation Age of Onset  Tuberculosis Mother  Other Father Social History: She  reports that she has quit smoking. She has never used smokeless tobacco.  
Social History Substance and Sexual Activity Alcohol Use Yes Comment: q hs Physical:  
Visit Vitals /69 (BP 1 Location: Left arm, BP Patient Position: Sitting) Pulse 63 Temp 98 °F (36.7 °C) (Oral) Resp 18 Ht 5' 0.5\" (1.537 m) Wt 146 lb (66.2 kg) SpO2 97% BMI 28.04 kg/m² Physical Exam  
Constitutional: She is well-developed, well-nourished, and in no distress. Eyes: No scleral icterus. Mild conjunctival pallor with icterus Neck: No JVD present. Cardiovascular: Regular rhythm and normal heart sounds. Exam reveals no gallop. No murmur heard. Pulmonary/Chest: Breath sounds normal. She has no wheezes. She has no rales. Musculoskeletal: She exhibits no edema. Lymphadenopathy:  
  She has no cervical adenopathy. Vitals reviewed. Review of Data: 
Labs: 
Orders Only on 04/03/2019 Component Date Value Ref Range Status  Occult blood fecal, by IA 04/05/2019 Positive* Negative Final  
Office Visit on 04/02/2019 Component Date Value Ref Range Status  WBC 04/02/2019 5.4  3.4 - 10.8 x10E3/uL Final  
 RBC 04/02/2019 3.25* 3.77 - 5.28 x10E6/uL Final  
 HGB 04/02/2019 10.7* 11.1 - 15.9 g/dL Final  
 HCT 04/02/2019 31.9* 34.0 - 46.6 % Final  
 MCV 04/02/2019 98* 79 - 97 fL Final  
 MCH 04/02/2019 32.9  26.6 - 33.0 pg Final  
 MCHC 04/02/2019 33.5  31.5 - 35.7 g/dL Final  
 RDW 04/02/2019 13.4  12.3 - 15.4 % Final  
 PLATELET 78/38/2898 311  150 - 379 x10E3/uL Final  
 TIBC 04/02/2019 405  250 - 450 ug/dL Final  
 UIBC 04/02/2019 380* 118 - 369 ug/dL Final  
 Iron 04/02/2019 25* 27 - 139 ug/dL Final  
 Iron % saturation 04/02/2019 6* 15 - 55 % Final  
 Vitamin B12 04/02/2019 362  232 - 1,245 pg/mL Final  
Hospital Outpatient Visit on 02/28/2019 Component Date Value Ref Range Status  WBC 02/28/2019 5.1  4.6 - 13.2 K/uL Final  
 RBC 02/28/2019 4.03* 4.20 - 5.30 M/uL Final  
 HGB 02/28/2019 13.8  12.0 - 16.0 g/dL Final  
 HCT 02/28/2019 42.2  35.0 - 45.0 % Final  
 MCV 02/28/2019 104.7* 74.0 - 97.0 FL Final  
 MCH 02/28/2019 34.2* 24.0 - 34.0 PG Final  
  MCHC 02/28/2019 32.7  31.0 - 37.0 g/dL Final  
 RDW 02/28/2019 12.6  11.6 - 14.5 % Final  
 PLATELET 59/17/8499 248  135 - 420 K/uL Final  
 MPV 02/28/2019 10.4  9.2 - 11.8 FL Final  
 NEUTROPHILS 02/28/2019 61  40 - 73 % Final  
 LYMPHOCYTES 02/28/2019 28  21 - 52 % Final  
 MONOCYTES 02/28/2019 9  3 - 10 % Final  
 EOSINOPHILS 02/28/2019 2  0 - 5 % Final  
 BASOPHILS 02/28/2019 0  0 - 2 % Final  
 ABS. NEUTROPHILS 02/28/2019 3.1  1.8 - 8.0 K/UL Final  
 ABS. LYMPHOCYTES 02/28/2019 1.5  0.9 - 3.6 K/UL Final  
 ABS. MONOCYTES 02/28/2019 0.5  0.05 - 1.2 K/UL Final  
 ABS. EOSINOPHILS 02/28/2019 0.1  0.0 - 0.4 K/UL Final  
 ABS. BASOPHILS 02/28/2019 0.0  0.0 - 0.1 K/UL Final  
 DF 02/28/2019 AUTOMATED    Final  
 Sodium 02/28/2019 138  136 - 145 mmol/L Final  
 Potassium 02/28/2019 4.2  3.5 - 5.5 mmol/L Final  
 Chloride 02/28/2019 101  100 - 108 mmol/L Final  
 CO2 02/28/2019 28  21 - 32 mmol/L Final  
 Anion gap 02/28/2019 9  3.0 - 18 mmol/L Final  
 Glucose 02/28/2019 82  74 - 99 mg/dL Final  
 BUN 02/28/2019 20* 7.0 - 18 MG/DL Final  
 Creatinine 02/28/2019 0.82  0.6 - 1.3 MG/DL Final  
 BUN/Creatinine ratio 02/28/2019 24* 12 - 20   Final  
 GFR est AA 02/28/2019 >60  >60 ml/min/1.73m2 Final  
 GFR est non-AA 02/28/2019 >60  >60 ml/min/1.73m2 Final  
 Calcium 02/28/2019 8.7  8.5 - 10.1 MG/DL Final  
 Bilirubin, total 02/28/2019 0.6  0.2 - 1.0 MG/DL Final  
 ALT (SGPT) 02/28/2019 21  13 - 56 U/L Final  
 AST (SGOT) 02/28/2019 16  15 - 37 U/L Final  
 Alk.  phosphatase 02/28/2019 41* 45 - 117 U/L Final  
 Protein, total 02/28/2019 6.7  6.4 - 8.2 g/dL Final  
 Albumin 02/28/2019 3.8  3.4 - 5.0 g/dL Final  
 Globulin 02/28/2019 2.9  2.0 - 4.0 g/dL Final  
 A-G Ratio 02/28/2019 1.3  0.8 - 1.7   Final  
 LIPID PROFILE 02/28/2019        Final  
 Cholesterol, total 02/28/2019 157  <200 MG/DL Final  
 Triglyceride 02/28/2019 80  <150 MG/DL Final  
  HDL Cholesterol 02/28/2019 70* 40 - 60 MG/DL Final  
 LDL, calculated 02/28/2019 71  0 - 100 MG/DL Final  
 VLDL, calculated 02/28/2019 16  MG/DL Final  
 CHOL/HDL Ratio 02/28/2019 2.2  0 - 5.0   Final  
 TSH 02/28/2019 1.80  0.36 - 3.74 uIU/mL Final  
 Color 02/28/2019 YELLOW    Final  
 Appearance 02/28/2019 CLEAR    Final  
 Specific gravity 02/28/2019 1.021  1.005 - 1.030   Final  
 pH (UA) 02/28/2019 6.5  5.0 - 8.0   Final  
 Protein 02/28/2019 NEGATIVE   NEG mg/dL Final  
 Glucose 02/28/2019 NEGATIVE   NEG mg/dL Final  
 Ketone 02/28/2019 NEGATIVE   NEG mg/dL Final  
 Bilirubin 02/28/2019 NEGATIVE   NEG   Final  
 Blood 02/28/2019 NEGATIVE   NEG   Final  
 Urobilinogen 02/28/2019 0.2  0.2 - 1.0 EU/dL Final  
 Nitrites 02/28/2019 NEGATIVE   NEG   Final  
 Leukocyte Esterase 02/28/2019 TRACE* NEG   Final  
 WBC 02/28/2019 1 to 3  0 - 4 /hpf Final  
 RBC 02/28/2019 0 to 1  0 - 5 /hpf Final  
 Epithelial cells 02/28/2019 FEW  0 - 5 /lpf Final  
 Bacteria 02/28/2019 NEGATIVE   NEG /hpf Final  
 
 
Impression/Plan: 
 Patient Active Problem List  
Diagnosis  Code Anemia with likely 2 components 1. VIDA from recent UGI bleed and PPI induced malabsorption 2. Reduced RBC production from nutritional (B12) or bone marrow disorder Continue iron, start B12, repeat CBC with retic, iron and B12 in 4 weeks and if persistent, arrange  BM biopsy through Dr. Seena Runner D64.9  
 PAF on NOAC + Tambocor Management per Dr. Jordan Gaston I48.0  Essential hypertension controlled on edited regimen  I10 Mariella Villalpando MD

## 2019-05-20 NOTE — PATIENT INSTRUCTIONS
Anemia>>Bleeding>>small bowel telangiectasis/AVM Iron deficiency>>resolved with iron supplement B12 (low normal in April)>>Vitamin B12 5000mcg under the tongue Dr. Brooks Hawk to address need for bone marrow exam 
 
 
Atrial Fibrillation>>Eliquis

## 2019-06-12 ENCOUNTER — HOSPITAL ENCOUNTER (OUTPATIENT)
Dept: LAB | Age: 84
Discharge: HOME | End: 2019-06-12
Payer: MEDICARE

## 2019-06-12 DIAGNOSIS — E53.8 VITAMIN B12 DEFICIENCY: ICD-10-CM

## 2019-06-12 DIAGNOSIS — D64.9 ANEMIA, UNSPECIFIED TYPE: ICD-10-CM

## 2019-06-12 LAB
ERYTHROCYTE [DISTWIDTH] IN BLOOD BY AUTOMATED COUNT: 20.6 % (ref 11.6–14.5)
HCT VFR BLD AUTO: 39.5 % (ref 35–45)
HGB BLD-MCNC: 12 G/DL (ref 12–16)
IRON SATN MFR SERPL: 30 %
IRON SERPL-MCNC: 118 UG/DL (ref 50–175)
MCH RBC QN AUTO: 28.3 PG (ref 24–34)
MCHC RBC AUTO-ENTMCNC: 30.4 G/DL (ref 31–37)
MCV RBC AUTO: 93.2 FL (ref 74–97)
PLATELET # BLD AUTO: 218 K/UL (ref 135–420)
PMV BLD AUTO: 10.5 FL (ref 9.2–11.8)
RBC # BLD AUTO: 4.24 M/UL (ref 4.2–5.3)
RETICS/RBC NFR AUTO: 3.4 % (ref 0.5–2.3)
TIBC SERPL-MCNC: 388 UG/DL (ref 250–450)
VIT B12 SERPL-MCNC: 1188 PG/ML (ref 211–911)
WBC # BLD AUTO: 5.1 K/UL (ref 4.6–13.2)

## 2019-06-12 PROCEDURE — 36415 COLL VENOUS BLD VENIPUNCTURE: CPT

## 2019-06-12 PROCEDURE — 83540 ASSAY OF IRON: CPT

## 2019-06-12 PROCEDURE — 82607 VITAMIN B-12: CPT

## 2019-06-12 PROCEDURE — 85045 AUTOMATED RETICULOCYTE COUNT: CPT

## 2019-06-12 PROCEDURE — 85027 COMPLETE CBC AUTOMATED: CPT

## 2019-06-20 ENCOUNTER — OFFICE VISIT (OUTPATIENT)
Dept: INTERNAL MEDICINE CLINIC | Age: 84
End: 2019-06-20

## 2019-06-20 VITALS
RESPIRATION RATE: 18 BRPM | HEART RATE: 65 BPM | SYSTOLIC BLOOD PRESSURE: 129 MMHG | DIASTOLIC BLOOD PRESSURE: 72 MMHG | WEIGHT: 146 LBS | TEMPERATURE: 98.6 F | BODY MASS INDEX: 27.56 KG/M2 | HEIGHT: 61 IN | OXYGEN SATURATION: 95 %

## 2019-06-20 DIAGNOSIS — D50.0 IRON DEFICIENCY ANEMIA DUE TO CHRONIC BLOOD LOSS: ICD-10-CM

## 2019-06-20 DIAGNOSIS — K55.21 AVM (ARTERIOVENOUS MALFORMATION) OF SMALL BOWEL, ACQUIRED WITH HEMORRHAGE: ICD-10-CM

## 2019-06-20 DIAGNOSIS — I10 ESSENTIAL HYPERTENSION: ICD-10-CM

## 2019-06-20 DIAGNOSIS — I48.0 PAROXYSMAL ATRIAL FIBRILLATION (HCC): Primary | ICD-10-CM

## 2019-06-20 NOTE — PROGRESS NOTES
HPI:     This christiano lady presents to the office for follow up visit and has had resolution of anemia following iron and B12 replacement. 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 She was cleared by Dr. Agustin Cates to resume Eliquis for stroke prophylaxis in setting of AF. He did not anticipate a need for small bowel resection. She is not aware of any palpitations on Fleicanide and is being followed by [facility] whose records have not been forwarded. Her echo revealed normal LVEF but dilated right atrial size. She has documented coronary atherosclerosis (CAC score >400) with recent negative NST. Her most recent lipid level is at target. She denies any chest pain. She remains on Omeprazole for GERD which is suspected to have resulted in aspiration that would account for the focal fibrosis in the RLL. She has been evaluated by Dr. Masha Stevenson (Pulmonary) but her notes are not on file.     Past Medical History:   Diagnosis Date    Age-related osteoporosis without current pathological fracture 4/28/2019    Atherosclerosis of native coronary artery of native heart 4/2/2019    [facility] >[facility]    Atrial fibrillation ([address]     AVM (arteriovenous malformation) of small bowel, acquired with hemorrhage 4/28/2019    Found on capsule endoscopy    Breast cancer ([address] 4/28/2019    S/P left partial mastectomy    Diverticulosis     GERD (gastroesophageal reflux disease)     Hypercholesterolemia     Hypertension     Immunizations up to date     Prevnar 12, Pneumovax, Zostavax, Flu    Iron deficiency anemia due to chronic blood loss 6/20/2019    Irritable bowel syndrome 4/28/2019    Junctional rhythm 2019    ascribed to Diltiazem    Macrocytosis     Mild aortic regurgitation     Mild mitral regurgitation     Osteoarthritis 4/28/2019    Osteoporosis     Pulmonary fibrosis ([address]     Pulmonary fibrosis, postinflammatory (HCC)     GERD    Sciatica      Past Surgical History:   Procedure Laterality Date    HX BUNIONECTOMY      HX HYSTERECTOMY      HX KNEE REPLACEMENT Right     HX OTHER SURGICAL      SCCA arm removed    HX SEPTOPLASTY      HX SHOULDER REPLACEMENT Left     [place] MASTECTOMY, PARTIAL Right 1992    REPAIR ENTEROCELE,ABD APPRCH       Current Outpatient Medications   Medication Sig    omeprazole (PRILOSEC) 40 mg capsule Take 1 Cap by mouth daily.  flecainide (TAMBOCOR) 100 mg tablet Take 100 mg by mouth two (2) times a day.  peg 400-propylene glycol (SYSTANE GEL) 0.4-0.3 % drpg Apply 1 Drop to eye four (4) times daily.  ipratropium (ATROVENT) 42 mcg (0.06 %) nasal spray 2 Sprays by Both Nostrils route daily.  fexofenadine (ALLEGRA) 180 mg tablet Take 180 mg by mouth daily.  albuterol (PROVENTIL HFA, VENTOLIN HFA, PROAIR HFA) 90 mcg/actuation inhaler Take 2 Puffs by inhalation every six (6) hours as needed.  raNITIdine (ZANTAC) 300 mg tab Take 1 Tab by mouth nightly.  candesartan (ATACAND) 8 mg tablet Take  by mouth daily.  vit a,c & e-lutein-minerals (OCUVITE) tablet daily.  montelukast (SINGULAIR) 10 mg tablet Take 10 mg by mouth daily.  simvastatin (ZOCOR) 10 mg tablet Take 1 Tab by mouth nightly.  temazepam (RESTORIL) 15 mg capsule Take 1 Cap by mouth nightly as needed.  valACYclovir (VALTREX) 500 mg tablet Take 1 Tab by mouth two (2) times a day.  apixaban (ELIQUIS) 5 mg tablet Take 1 Tab by mouth two (2) times a day. No current facility-administered medications for this visit. Allergies and Intolerances: Allergies   Allergen Reactions    Latex Itching     Gloves only    Atorvastatin Myalgia    Diltiazem Other (comments)     Junctional rhythm    Hydromorphone Itching     Possibly allergic.   Had some difficulty with PCA pump after Knee replacement    Morphine Itching    Percocet [Oxycodone-Acetaminophen] Itching     Family History:   Family History   Problem Relation Age of Onset    Tuberculosis Mother    Jodie Polo Other Father Social History:   She  reports that she has quit smoking. She has never used smokeless tobacco.   Social History     Substance and Sexual Activity   Alcohol Use Yes    Comment: q hs       Review of Systems   Constitutional: Negative for chills, fever and weight loss. HENT: Negative for hearing loss. Respiratory: Negative for cough, shortness of breath and wheezing. Cardiovascular: Negative for chest pain, palpitations and leg swelling. Gastrointestinal: Positive for heartburn. Negative for blood in stool and melena. Neurological: Negative for dizziness and headaches. Psychiatric/Behavioral: Negative for depression. The patient does not have insomnia. Physical:   Visit Vitals  /72 (BP 1 Location: Left arm, BP Patient Position: Sitting)   Pulse 65   Temp 98.6 °F (37 °C) (Oral)   Resp 18   Ht 5' 0.5\" (1.537 m)   Wt 146 lb (66.2 kg)   SpO2 95%   BMI 28.04 kg/m²          Physical Exam   Constitutional: She is well-developed, well-nourished, and in no distress. Eyes: Conjunctivae are normal. No scleral icterus. Neck: No JVD present. Cardiovascular: Normal rate, regular rhythm, normal heart sounds and intact distal pulses. Exam reveals no gallop. No murmur heard. Pulmonary/Chest: Breath sounds normal.   Abdominal: Soft. Bowel sounds are normal. She exhibits no mass. There is no tenderness. Musculoskeletal: She exhibits no edema. Lymphadenopathy:     She has no cervical adenopathy.         Review of Data:  Labs:  Hospital Outpatient Visit on 06/12/2019   Component Date Value Ref Range Status    WBC 06/12/2019 5.1  4.6 - 13.2 K/uL Final    RBC 06/12/2019 4.24  4.20 - 5.30 M/uL Final    HGB 06/12/2019 12.0  12.0 - 16.0 g/dL Final    HCT 06/12/2019 39.5  35.0 - 45.0 % Final    MCV 06/12/2019 93.2  74.0 - 97.0 FL Final    MCH 06/12/2019 28.3  24.0 - 34.0 PG Final    MCHC 06/12/2019 30.4* 31.0 - 37.0 g/dL Final    RDW 06/12/2019 20.6* 11.6 - 14.5 % Final    PLATELET 65/99/7151 218  135 - 420 K/uL Final    MPV 06/12/2019 10.5  9.2 - 11.8 FL Final    Vitamin B12 06/12/2019 1,188* 211 - 911 pg/mL Final    Iron 06/12/2019 118  50 - 175 ug/dL Final    TIBC 06/12/2019 388  250 - 450 ug/dL Final    Iron % saturation 06/12/2019 30  % Final    Reticulocyte count 06/12/2019 3.4* 0.5 - 2.3 % Final   Orders Only on 04/03/2019   Component Date Value Ref Range Status    Occult blood fecal, by IA 04/05/2019 Positive* Negative Final   Office Visit on 04/02/2019   Component Date Value Ref Range Status    WBC 04/02/2019 5.4  3.4 - 10.8 x10E3/uL Final    RBC 04/02/2019 3.25* 3.77 - 5.28 x10E6/uL Final    HGB 04/02/2019 10.7* 11.1 - 15.9 g/dL Final    HCT 04/02/2019 31.9* 34.0 - 46.6 % Final    MCV 04/02/2019 98* 79 - 97 fL Final    MCH 04/02/2019 32.9  26.6 - 33.0 pg Final    MCHC 04/02/2019 33.5  31.5 - 35.7 g/dL Final    RDW 04/02/2019 13.4  12.3 - 15.4 % Final    PLATELET 80/34/3027 089  150 - 379 x10E3/uL Final    TIBC 04/02/2019 405  250 - 450 ug/dL Final    UIBC 04/02/2019 380* 118 - 369 ug/dL Final    Iron 04/02/2019 25* 27 - 139 ug/dL Final    Iron % saturation 04/02/2019 6* 15 - 55 % Final    Vitamin B12 04/02/2019 362  232 - 1,245 pg/mL Final         Impression/Plan:   Patient Active Problem List   Diagnosis  Code    VIDA from GI bleed, corrected with iron replacement Monitor clinically, repeat CBC with iron panel next visit, surgical intervention not anticipated   D50.0    PAF on chronic NOAC and Flecainide Obtain records from [facility]   I48.0    Essential hypertension Continue current regimen   I10    Gastroesophageal reflux disease without esophagitis   Continue H2A and PPI K21.9    Mixed hyperlipidemia, controlled   Repeat FLP next visit E78.2    Atherosclerosis of native coronary artery of native heart with negative NST   Risk reduction strategy in place, obtain [facility] note I25.10    Pulmonary fibrosis, postinflammatory (HCC) Treat underlying etilology J84.10 Krys Braswell MD

## 2019-06-20 NOTE — PATIENT INSTRUCTIONS
Cholesterol>>perfect on Zocor BP controlled on Atacand Atrial Fibrillation>>Flecainide and Eliquis Asthma>>stable on current meds Iron deficiency anemia>>corrected with iron replacement (related to small bowel AVM)

## 2019-06-20 NOTE — PROGRESS NOTES
Chief Complaint   Patient presents with    Cholesterol Problem    Hypertension    GERD     1. Have you been to the ER, urgent care clinic since your last visit? Hospitalized since your last visit? No    2. Have you seen or consulted any other health care providers outside of the [address] [place] [address] since your last visit? Include any pap smears or colon screening.  No

## 2019-07-03 RX ORDER — MONTELUKAST SODIUM 10 MG/1
10 TABLET ORAL DAILY
Qty: 90 TAB | Refills: 3 | Status: SHIPPED | OUTPATIENT
Start: 2019-07-03

## 2019-07-03 NOTE — TELEPHONE ENCOUNTER
Patient called requesting refill on Singulair 10mg   Patient last appointment was 06/20/19  Patient next appointment is 09/20/19  Pharmacy patient wants refill to go to is [facility]     Pt is all out and did not realize it.

## 2019-07-22 ENCOUNTER — OFFICE VISIT (OUTPATIENT)
Dept: INTERNAL MEDICINE CLINIC | Age: 84
End: 2019-07-22

## 2019-07-22 VITALS
TEMPERATURE: 98.9 F | HEART RATE: 60 BPM | SYSTOLIC BLOOD PRESSURE: 139 MMHG | RESPIRATION RATE: 18 BRPM | OXYGEN SATURATION: 96 % | BODY MASS INDEX: 27.75 KG/M2 | DIASTOLIC BLOOD PRESSURE: 64 MMHG | WEIGHT: 147 LBS | HEIGHT: 61 IN

## 2019-07-22 DIAGNOSIS — J84.10 PULMONARY FIBROSIS, POSTINFLAMMATORY (HCC): Primary | ICD-10-CM

## 2019-07-22 DIAGNOSIS — M17.12 PRIMARY OSTEOARTHRITIS OF LEFT KNEE: ICD-10-CM

## 2019-07-22 NOTE — PROGRESS NOTES
Chief Complaint   Patient presents with    Hypertension     fluxuating B/P    Medication Evaluation     1. Have you been to the ER, urgent care clinic since your last visit? Hospitalized since your last visit? No    2. Have you seen or consulted any other health care providers outside of the [address] [place] [address] since your last visit? Include any pap smears or colon screening.  No

## 2019-07-22 NOTE — PROGRESS NOTES
HPI:     This christiano lady presents to the office concerned about labile BP readings. Her BP was running low at 112 last week and decided to hold the Atacand and soon after her BP measurements started climbing to 161. She was not symptomatic with any of the BP readings. She did not bring her machine to compare with our readings. She also was concerned that her heart rate has been in the 50s despite the discontinuation of Diltiazem but I told her that the Tambocor may cause bradycardia. She had a Holter monitor but that report is not on file to determine if she has SSS syndrome. She has not alerted Dr. Radha Green to this development. She has resumed Eliquis after clearance from Dr. John Dexter and she denies any passage of melenic stools. Her anemia has resolved following B12 and iron replacement. She also informs me that Dr. Rosales Muñoz has moved to [facility] and wanted to know who she should follow up with for her focal fibrosis and I recommended Dr. Liam Thompson.     Past Medical History:   Diagnosis Date    Age-related osteoporosis without current pathological fracture 4/28/2019    Atherosclerosis of native coronary artery of native heart 4/2/2019    [facility] at [facility] >400    Atrial fibrillation ([address]     AVM (arteriovenous malformation) of small bowel, acquired with hemorrhage 4/28/2019    Found on capsule endoscopy    Breast cancer ([address] 4/28/2019    S/P left partial mastectomy    Diverticulosis     GERD (gastroesophageal reflux disease)     Hypercholesterolemia     Hypertension     Immunizations up to date     Prevnar 13, Pneumovax, Zostavax, Flu    Iron deficiency anemia due to chronic blood loss 6/20/2019    Irritable bowel syndrome 4/28/2019    Junctional rhythm 2019    ascribed to Diltiazem    Macrocytosis     Mild aortic regurgitation     Mild mitral regurgitation     Osteoarthritis 4/28/2019    Osteoporosis     Pulmonary fibrosis ([address]     Pulmonary fibrosis, postinflammatory (HCC)     GERD  Sciatica      Past Surgical History:   Procedure Laterality Date    HX BUNIONECTOMY      HX HYSTERECTOMY      HX KNEE REPLACEMENT Right     HX OTHER SURGICAL      SCCA arm removed    HX SEPTOPLASTY      HX SHOULDER REPLACEMENT Left     [place] MASTECTOMY, PARTIAL Right 1992    REPAIR ENTEROCELE,ABD APPRCH       Current Outpatient Medications   Medication Sig    montelukast (SINGULAIR) 10 mg tablet Take 1 Tab by mouth daily.  omeprazole (PRILOSEC) 40 mg capsule Take 1 Cap by mouth daily.  flecainide (TAMBOCOR) 100 mg tablet Take 100 mg by mouth two (2) times a day.  peg 400-propylene glycol (SYSTANE GEL) 0.4-0.3 % drpg Apply 1 Drop to eye four (4) times daily.  ipratropium (ATROVENT) 42 mcg (0.06 %) nasal spray 2 Sprays by Both Nostrils route daily.  fexofenadine (ALLEGRA) 180 mg tablet Take 180 mg by mouth daily.  albuterol (PROVENTIL HFA, VENTOLIN HFA, PROAIR HFA) 90 mcg/actuation inhaler Take 2 Puffs by inhalation every six (6) hours as needed.  raNITIdine (ZANTAC) 300 mg tab Take 1 Tab by mouth nightly.  candesartan (ATACAND) 8 mg tablet Take  by mouth daily.  vit a,c & e-lutein-minerals (OCUVITE) tablet daily.  simvastatin (ZOCOR) 10 mg tablet Take 1 Tab by mouth nightly.  temazepam (RESTORIL) 15 mg capsule Take 1 Cap by mouth nightly as needed.  valACYclovir (VALTREX) 500 mg tablet Take 1 Tab by mouth two (2) times a day.  apixaban (ELIQUIS) 5 mg tablet Take 1 Tab by mouth two (2) times a day. No current facility-administered medications for this visit. Allergies and Intolerances: Allergies   Allergen Reactions    Latex Itching     Gloves only    Atorvastatin Myalgia    Diltiazem Other (comments)     Junctional rhythm    Hydromorphone Itching     Possibly allergic.   Had some difficulty with PCA pump after Knee replacement    Morphine Itching    Percocet [Oxycodone-Acetaminophen] Itching     Family History:   Family History   Problem Relation Age of Onset    Tuberculosis Mother     Other Father      Social History:   She  reports that she has quit smoking. She has never used smokeless tobacco.   Social History     Substance and Sexual Activity   Alcohol Use Yes    Comment: q hs       Physical:   Visit Vitals  /64 (BP 1 Location: Left arm, BP Patient Position: Sitting)   Pulse 60   Temp 98.9 °F (37.2 °C) (Oral)   Resp 18   Ht 5' 0.5\" (1.537 m)   Wt 147 lb (66.7 kg)   SpO2 96%   BMI 28.24 kg/m²          Physical Exam   Constitutional: She is well-developed, well-nourished, and in no distress. HENT:   Mouth/Throat: Oropharynx is clear and moist.   Eyes: Conjunctivae are normal. No scleral icterus. Neck: No JVD present. Cardiovascular: Regular rhythm and normal heart sounds. Bradycardia present. Exam reveals no gallop. No murmur heard. Pulmonary/Chest: Breath sounds normal.   Abdominal: Soft. Bowel sounds are normal.   Musculoskeletal: She exhibits no edema. Feet:    Skin: Skin is warm.         Bruise left foot  Normal S1S2  Clear lungs  No edema    Review of Data:  Labs:  Hospital Outpatient Visit on 06/12/2019   Component Date Value Ref Range Status    WBC 06/12/2019 5.1  4.6 - 13.2 K/uL Final    RBC 06/12/2019 4.24  4.20 - 5.30 M/uL Final    HGB 06/12/2019 12.0  12.0 - 16.0 g/dL Final    HCT 06/12/2019 39.5  35.0 - 45.0 % Final    MCV 06/12/2019 93.2  74.0 - 97.0 FL Final    MCH 06/12/2019 28.3  24.0 - 34.0 PG Final    MCHC 06/12/2019 30.4* 31.0 - 37.0 g/dL Final    RDW 06/12/2019 20.6* 11.6 - 14.5 % Final    PLATELET 57/12/0639 341  135 - 420 K/uL Final    MPV 06/12/2019 10.5  9.2 - 11.8 FL Final    Vitamin B12 06/12/2019 1,188* 211 - 911 pg/mL Final    Iron 06/12/2019 118  50 - 175 ug/dL Final    TIBC 06/12/2019 388  250 - 450 ug/dL Final    Iron % saturation 06/12/2019 30  % Final    Reticulocyte count 06/12/2019 3.4* 0.5 - 2.3 % Final       Impression/Plan:   Patient Active Problem List   Diagnosis  Code    Essential hypertension with variable control Reassurance that variability is not uncommon and to take Atacand 8mg daily and monitor BP trend   I10    Bradycardia likely from Tambocor but need to consider SSS Alert Dr. Ashu Millan as she may need Holter to rule out significant pause that may require Holter   R00.1    Paroxysmal atrial fibrillation (HCC) Continue Eliquis, avoid [place] blocking agents   I48.0    Pulmonary fibrosis, postinflammatory (HCC) from GERD with aspiration Anti reflux measures, referral to Dr. Prince Vera   J84.10    Iron deficiency anemia due to chronic blood loss likely from small bowel AVM, resolved Management per Dr. Mely Watson D50.0         Jennifer Montelongo MD

## 2019-07-22 NOTE — PATIENT INSTRUCTIONS
Blood pressure>>goal <056 systolic<<continue Atacand 8mg a day    Bradycardia by definition is heart rate below 60    Tambocor suppresses AF and slows down heart rate

## 2019-08-23 ENCOUNTER — TELEPHONE (OUTPATIENT)
Dept: INTERNAL MEDICINE CLINIC | Age: 84
End: 2019-08-23

## 2019-08-23 NOTE — TELEPHONE ENCOUNTER
Pt calling with information that she saw her Cardiologist yesterday, and her BP was still high. They upped her Atacand from 8mg to 16mg. She also would like a recommendation on who she should see after Dr Daphne Oliveira the practice. Lastly, she states her pulmonary medicine doctor is leaving as well, and was seeing if Dr Nikole Ramirez had a recommendation on who else she should see in that office.

## 2019-10-24 ENCOUNTER — OFFICE VISIT (OUTPATIENT)
Dept: INTERNAL MEDICINE CLINIC | Age: 84
End: 2019-10-24

## 2019-10-24 VITALS
HEART RATE: 54 BPM | SYSTOLIC BLOOD PRESSURE: 129 MMHG | RESPIRATION RATE: 16 BRPM | BODY MASS INDEX: 28.66 KG/M2 | TEMPERATURE: 97.9 F | WEIGHT: 146 LBS | HEIGHT: 60 IN | DIASTOLIC BLOOD PRESSURE: 66 MMHG | OXYGEN SATURATION: 98 %

## 2019-10-24 DIAGNOSIS — K55.21 AVM (ARTERIOVENOUS MALFORMATION) OF SMALL BOWEL, ACQUIRED WITH HEMORRHAGE: Primary | ICD-10-CM

## 2019-10-24 DIAGNOSIS — I10 ESSENTIAL HYPERTENSION: ICD-10-CM

## 2019-10-24 DIAGNOSIS — D51.9 ANEMIA DUE TO VITAMIN B12 DEFICIENCY, UNSPECIFIED B12 DEFICIENCY TYPE: ICD-10-CM

## 2019-10-24 DIAGNOSIS — D50.9 IRON DEFICIENCY ANEMIA, UNSPECIFIED IRON DEFICIENCY ANEMIA TYPE: ICD-10-CM

## 2019-10-24 NOTE — PROGRESS NOTES
Chief Complaint   Patient presents with    Follow-up     3 month follow up. 1. Have you been to the ER, urgent care clinic since your last visit? Hospitalized since your last visit? Yes Where: 9/18 fall and fx orbital bone left eye    2. Have you seen or consulted any other health care providers outside of the [address] [place] [address] since your last visit? Include any pap smears or colon screening.  Yes Where: [facility]

## 2019-11-04 NOTE — PROGRESS NOTES
HISTORY OF PRESENT ILLNESS  Shante Singletary is a 80 y.o. female. HPI  Pt is here for f/u on her anemia and labs. She is doing well and has no complaints today. Her BP has been good and she has f/u with hematology and GI in a few weeks. Allergies   Allergen Reactions    Latex Itching     Gloves only    Atorvastatin Myalgia    Diltiazem Other (comments)     Junctional rhythm    Hydromorphone Itching     Possibly allergic. Had some difficulty with PCA pump after Knee replacement    Morphine Itching    Percocet [Oxycodone-Acetaminophen] Itching       Current Outpatient Medications   Medication Sig    montelukast (SINGULAIR) 10 mg tablet Take 1 Tab by mouth daily.  omeprazole (PRILOSEC) 40 mg capsule Take 1 Cap by mouth daily.  flecainide (TAMBOCOR) 100 mg tablet Take 100 mg by mouth two (2) times a day.  peg 400-propylene glycol (SYSTANE GEL) 0.4-0.3 % drpg Apply 1 Drop to eye four (4) times daily.  ipratropium (ATROVENT) 42 mcg (0.06 %) nasal spray 2 Sprays by Both Nostrils route daily.  fexofenadine (ALLEGRA) 180 mg tablet Take 180 mg by mouth daily.  albuterol (PROVENTIL HFA, VENTOLIN HFA, PROAIR HFA) 90 mcg/actuation inhaler Take 2 Puffs by inhalation every six (6) hours as needed.  raNITIdine (ZANTAC) 300 mg tab Take 1 Tab by mouth nightly.  candesartan (ATACAND) 8 mg tablet Take  by mouth daily.  vit a,c & e-lutein-minerals (OCUVITE) tablet daily.  simvastatin (ZOCOR) 10 mg tablet Take 1 Tab by mouth nightly.  temazepam (RESTORIL) 15 mg capsule Take 1 Cap by mouth nightly as needed.  valACYclovir (VALTREX) 500 mg tablet Take 1 Tab by mouth two (2) times a day.  apixaban (ELIQUIS) 5 mg tablet Take 1 Tab by mouth two (2) times a day. No current facility-administered medications for this visit. Review of Systems   Constitutional: Negative. Negative for chills, fever and malaise/fatigue. HENT: Negative.   Negative for congestion, ear pain, sore throat and tinnitus. Eyes: Negative. Negative for blurred vision, double vision and photophobia. Respiratory: Negative. Negative for cough, shortness of breath and wheezing. Cardiovascular: Negative. Negative for chest pain, palpitations and leg swelling. Gastrointestinal: Negative. Negative for abdominal pain, heartburn, nausea and vomiting. Genitourinary: Negative. Negative for dysuria, frequency, hematuria and urgency. Musculoskeletal: Negative. Negative for back pain, joint pain, myalgias and neck pain. Skin: Negative. Negative for itching and rash. Neurological: Negative. Negative for dizziness, tingling, tremors and headaches. Psychiatric/Behavioral: Negative. Negative for depression and memory loss. The patient is not nervous/anxious and does not have insomnia. Visit Vitals  /66 (BP 1 Location: Right arm, BP Patient Position: Sitting)   Pulse (!) 54   Temp 97.9 °F (36.6 °C) (Oral)   Resp 16   Ht 5' (1.524 m)   Wt 146 lb (66.2 kg)   SpO2 98%   BMI 28.51 kg/m²       Physical Exam   Constitutional: She is oriented to person, place, and time. She appears well-developed and well-nourished. No distress. HENT:   Head: Normocephalic and atraumatic. Cardiovascular: Normal rate, regular rhythm and normal heart sounds. Pulmonary/Chest: Effort normal and breath sounds normal.   Neurological: She is alert and oriented to person, place, and time. Skin: Skin is warm and dry. She is not diaphoretic. Psychiatric: She has a normal mood and affect. Her behavior is normal.       ASSESSMENT and PLAN    ICD-10-CM ICD-9-CM    1. AVM (arteriovenous malformation) of small bowel, acquired with hemorrhage K55.21 569.85 CBC WITH AUTOMATED DIFF      IRON PROFILE      FERRITIN      RETICULOCYTE COUNT      VITAMIN B12   2.  Anemia due to vitamin B12 deficiency, unspecified B12 deficiency type D51.9 281.1 CBC WITH AUTOMATED DIFF      IRON PROFILE      FERRITIN      RETICULOCYTE COUNT      VITAMIN B12   3. Iron deficiency anemia, unspecified iron deficiency anemia type D50.9 280.9 CBC WITH AUTOMATED DIFF      IRON PROFILE      FERRITIN      RETICULOCYTE COUNT      VITAMIN B12   4. Essential hypertension I10 401.9      Follow-up and Dispositions    · Return if symptoms worsen or fail to improve. Pt expressed understanding of visit summary and plans for any follow ups or referrals as well as any medications prescribed.

## 2022-06-23 PROBLEM — I49.9 CARDIAC ARRHYTHMIA: Status: ACTIVE | Noted: 2022-06-23
